# Patient Record
Sex: FEMALE | Race: WHITE | URBAN - METROPOLITAN AREA
[De-identification: names, ages, dates, MRNs, and addresses within clinical notes are randomized per-mention and may not be internally consistent; named-entity substitution may affect disease eponyms.]

---

## 2024-07-18 ENCOUNTER — OFFICE VISIT (OUTPATIENT)
Dept: PRIMARY CARE CLINIC | Age: 29
End: 2024-07-18
Payer: MEDICAID

## 2024-07-18 VITALS
BODY MASS INDEX: 36.29 KG/M2 | RESPIRATION RATE: 18 BRPM | HEART RATE: 83 BPM | TEMPERATURE: 98.4 F | WEIGHT: 225.8 LBS | SYSTOLIC BLOOD PRESSURE: 128 MMHG | HEIGHT: 66 IN | DIASTOLIC BLOOD PRESSURE: 80 MMHG | OXYGEN SATURATION: 98 %

## 2024-07-18 DIAGNOSIS — F41.9 ANXIETY AND DEPRESSION: ICD-10-CM

## 2024-07-18 DIAGNOSIS — E01.0 THYROMEGALY: Primary | ICD-10-CM

## 2024-07-18 DIAGNOSIS — E66.9 OBESITY (BMI 30-39.9): ICD-10-CM

## 2024-07-18 DIAGNOSIS — E56.9 VITAMIN DEFICIENCY: ICD-10-CM

## 2024-07-18 DIAGNOSIS — R73.03 BORDERLINE DIABETES: ICD-10-CM

## 2024-07-18 DIAGNOSIS — F32.A ANXIETY AND DEPRESSION: ICD-10-CM

## 2024-07-18 PROCEDURE — 99204 OFFICE O/P NEW MOD 45 MIN: CPT | Performed by: FAMILY MEDICINE

## 2024-07-18 RX ORDER — PRAZOSIN HYDROCHLORIDE 1 MG/1
1 CAPSULE ORAL NIGHTLY
COMMUNITY
Start: 2024-07-11 | End: 2024-08-10

## 2024-07-18 RX ORDER — FOLIC ACID 1 MG/1
2 TABLET ORAL DAILY
COMMUNITY
Start: 2024-07-11 | End: 2024-08-10

## 2024-07-18 RX ORDER — DULOXETIN HYDROCHLORIDE 30 MG/1
30 CAPSULE, DELAYED RELEASE ORAL 2 TIMES DAILY
COMMUNITY
Start: 2024-06-28

## 2024-07-18 RX ORDER — ERGOCALCIFEROL 1.25 MG/1
50000 CAPSULE ORAL WEEKLY
COMMUNITY
Start: 2024-07-11

## 2024-07-18 RX ORDER — BUSPIRONE HYDROCHLORIDE 15 MG/1
15 TABLET ORAL 2 TIMES DAILY
COMMUNITY
Start: 2024-07-11 | End: 2024-08-10

## 2024-07-18 RX ORDER — NORGESTIMATE AND ETHINYL ESTRADIOL 0.25-0.035
1 KIT ORAL DAILY
COMMUNITY

## 2024-07-18 NOTE — PATIENT INSTRUCTIONS
[facility] Food Resources*  (Call [employer]/211 if need more resources.)          [employer]   Speak to a trained professional 24/7 who can connect you to essential community services including food, clothing, transportation, housing, utilities, employment services, childcare, and baby supplies. 211 serves nationwide.  [facility]org for resources in [place], [facility], [place] and [facility] in [place]; [place], [place], [place], and [facility] in [place].   [email or web address]/resources for resources in [facility], [place], [place], [place], [place], [place], [place], [facility], [place], [place], and [facility] in [place].    [employer]   What they offer: [employer] is a nationwide network of food resendez, food pantries, and meal programs.  Website: https://www.feeding[place]ica.org/find-your-local-foodbank      National Hunger Hotline  What they offer: The hotline is a resource for individuals and families seeking information on how and where to obtain food.   Website:  https://www.hungerfreeamerica.org/en-us/[facility]-national-hunger-hotline    Hunger Hotline Phone Number: 7-797-4-HUNGRY (1-694.490.7583)  Hours of Operation: Monday - Friday 7am to 10pm   The Hunger Hotline also operates a texting service. Our number is 338-000-4533. Please note that these are automated texts and we do not reply or monitor texts.   [employer]  What they offer: $1 for $1 matching for families receiving SNAP/food stamps when spent on “healthy” food.  The match money can be used to purchase fruits and vegetables so adds more healthy food choices for SNAP beneficiaries.   Contact: https://[employer].Tellwiki/locations/     SNAP (formerly [employer] [place])   What they offer: SNAP is used like cash to buy eligible food items from authorized retailers.  Apply for benefits online: https://jfs.[place].gov/ofam/foodstamps.stm     Apply for benefits by phone or in-person by visiting your local [employer].

## 2024-07-18 NOTE — PROGRESS NOTES
Chief Complaint   Patient presents with    New Patient     Establish care         Anabella Bradford is a 28 y.o. female who presents to be established as she recently moved here from [facility]    Patient has a past medical history significant for anxiety/depression and PTSD and was being followed by Psychiatry and is on buspar and cymbalta with good control and pt is seeing a therapist once a week    Pt has a hx of borderline diabetes and is on metformin and has been folowing a diabetic diet    Pt is on BCPs    Pt denies any hx of HTN or asthma    Pt is a nonsmoker and denies any alcohol or illegal drug use    FHx positive for diabetes      Review of Systems   Constitutional:  Negative for fatigue and fever.   HENT:  Negative for congestion, nosebleeds and sore throat.    Eyes:         Negative blurred vision or diplopia   Respiratory:  Negative for cough and shortness of breath.    Cardiovascular:  Negative for chest pain, palpitations and leg swelling.   Gastrointestinal:  Negative for abdominal pain, blood in stool, diarrhea, nausea and vomiting.        Negative melena or indigestion   Endocrine: Negative for polydipsia and polyuria.   Genitourinary:  Negative for dysuria and hematuria.   Musculoskeletal:  Negative for arthralgias.   Skin:  Negative for rash.   Neurological:  Positive for headaches. Negative for dizziness, seizures, syncope, speech difficulty and weakness.   Psychiatric/Behavioral:  Positive for dysphoric mood (at times). Negative for suicidal ideas. The patient is nervous/anxious (at times).          Vitals:    07/18/24 0958   BP: 128/80   Pulse: 83   Resp: 18   Temp: 98.4 °F (36.9 °C)   SpO2: 98%         Physical Exam  Vitals reviewed.   Constitutional:       General: She is not in acute distress.  HENT:      Mouth/Throat:      Mouth: Mucous membranes are moist.      Pharynx: Oropharynx is clear.   Eyes:      General: No scleral icterus.     Comments: Pink conjunctivae    Neck: